# Patient Record
Sex: MALE | Race: BLACK OR AFRICAN AMERICAN | NOT HISPANIC OR LATINO | URBAN - METROPOLITAN AREA
[De-identification: names, ages, dates, MRNs, and addresses within clinical notes are randomized per-mention and may not be internally consistent; named-entity substitution may affect disease eponyms.]

---

## 2021-07-14 ENCOUNTER — OFFICE VISIT (OUTPATIENT)
Dept: URBAN - METROPOLITAN AREA CLINIC 92 | Facility: CLINIC | Age: 61
End: 2021-07-14
Payer: MEDICARE

## 2021-07-14 DIAGNOSIS — R10.33 PERIUMBILICAL ABDOMINAL PAIN: ICD-10-CM

## 2021-07-14 DIAGNOSIS — Z86.010 PERSONAL HISTORY OF COLONIC POLYPS: ICD-10-CM

## 2021-07-14 PROBLEM — 428283002: Status: ACTIVE | Noted: 2021-07-14

## 2021-07-14 PROCEDURE — 99204 OFFICE O/P NEW MOD 45 MIN: CPT | Performed by: INTERNAL MEDICINE

## 2021-07-14 RX ORDER — PANTOPRAZOLE SODIUM 40 MG/1
1 TABLET TABLET, DELAYED RELEASE ORAL ONCE A DAY
Qty: 90 TABLET | Refills: 3 | OUTPATIENT
Start: 2021-07-14

## 2021-07-14 NOTE — HPI-TODAY'S VISIT:
This is a 60-year-old male who now presents for evaluation of abdominal pain.    Patient developed acute periumbilical abdominal pain requiring evaluation at [facility] ED on 07/01/2021.  He had labs that showed no significant abnormality.  He was prescribed Nexium 40 mg daily along with Carafate.  He reports having abdominal pain which can be postprandial in nature and is partially improved with Mylanta.  He has regular bowel movement and denies any hematemesis, melena, or hematochezia.  There is no fever, chills, or constitutional symptoms including unintentional weight loss. he denies any NSAID use but admits to 81 mg aspirin daily  He has history of colon polyps and previously underwent colonoscopy on 03/09/2020 that showed 3 tubular adenomas internal hemorrhoids.

## 2021-08-24 ENCOUNTER — OFFICE VISIT (OUTPATIENT)
Dept: URBAN - METROPOLITAN AREA SURGERY CENTER 16 | Facility: SURGERY CENTER | Age: 61
End: 2021-08-24

## 2021-09-08 ENCOUNTER — OFFICE VISIT (OUTPATIENT)
Dept: URBAN - METROPOLITAN AREA CLINIC 92 | Facility: CLINIC | Age: 61
End: 2021-09-08

## 2021-09-29 ENCOUNTER — OFFICE VISIT (OUTPATIENT)
Dept: URBAN - METROPOLITAN AREA CLINIC 92 | Facility: CLINIC | Age: 61
End: 2021-09-29

## 2021-10-20 ENCOUNTER — OFFICE VISIT (OUTPATIENT)
Dept: URBAN - METROPOLITAN AREA CLINIC 92 | Facility: CLINIC | Age: 61
End: 2021-10-20

## 2021-10-20 RX ORDER — PANTOPRAZOLE SODIUM 40 MG/1
1 TABLET TABLET, DELAYED RELEASE ORAL ONCE A DAY
Qty: 90 TABLET | Refills: 3 | COMMUNITY
Start: 2021-07-14

## 2021-11-18 ENCOUNTER — OFFICE VISIT (OUTPATIENT)
Dept: URBAN - METROPOLITAN AREA SURGERY CENTER 16 | Facility: SURGERY CENTER | Age: 61
End: 2021-11-18

## 2021-12-15 ENCOUNTER — OFFICE VISIT (OUTPATIENT)
Dept: URBAN - METROPOLITAN AREA CLINIC 92 | Facility: CLINIC | Age: 61
End: 2021-12-15

## 2022-01-06 ENCOUNTER — OFFICE VISIT (OUTPATIENT)
Dept: URBAN - METROPOLITAN AREA SURGERY CENTER 16 | Facility: SURGERY CENTER | Age: 62
End: 2022-01-06

## 2023-06-23 ENCOUNTER — OFFICE VISIT (OUTPATIENT)
Dept: URBAN - METROPOLITAN AREA CLINIC 92 | Facility: CLINIC | Age: 63
End: 2023-06-23

## 2023-07-31 ENCOUNTER — OFFICE VISIT (OUTPATIENT)
Dept: URBAN - METROPOLITAN AREA CLINIC 92 | Facility: CLINIC | Age: 63
End: 2023-07-31

## 2023-10-05 ENCOUNTER — OFFICE VISIT (OUTPATIENT)
Dept: URBAN - METROPOLITAN AREA CLINIC 92 | Facility: CLINIC | Age: 63
End: 2023-10-05

## 2023-10-05 RX ORDER — PANTOPRAZOLE SODIUM 40 MG/1
1 TABLET TABLET, DELAYED RELEASE ORAL ONCE A DAY
Qty: 90 TABLET | Refills: 3 | COMMUNITY
Start: 2021-07-14

## 2023-10-05 NOTE — HPI-TODAY'S VISIT:
year-old female/male who presents for a colon cancer screening.   Last colonoscopy:  told to repeat in  No family history of colon polyps or colon cancer.  Patient denies nausea, heartburn, dysphagia, abdominal pain, rectal bleeding, melena, unintentional weight loss, changes in bowel habits and loss of appetite. Patinet denies blood thinner use, pacemaker/defibrillator, diabetes, kidney disease, and home O2.  [facility] 2021 This is a 60-year-old male who now presents for evaluation of abdominal pain.        Patient developed acute periumbilical abdominal pain requiring evaluation at [facility] ED on 07/01/2021. He had labs that showed no significant abnormality. He was prescribed Nexium 40 mg daily along with Carafate. He reports having abdominal pain which can be postprandial in nature and is partially improved with Mylanta. He has regular bowel movement and denies any hematemesis, melena, or hematochezia. There is no fever, chills, or constitutional symptoms including unintentional weight loss. he denies any NSAID use but admits to 81 mg aspirin daily        He has history of colon polyps and previously underwent colonoscopy on 03/09/2020 that showed 3 tubular adenomas internal hemorrhoids.  Was told to do EGD and start protonix egd was not done.

## 2023-10-26 ENCOUNTER — OFFICE VISIT (OUTPATIENT)
Dept: URBAN - METROPOLITAN AREA CLINIC 92 | Facility: CLINIC | Age: 63
End: 2023-10-26

## 2023-10-26 RX ORDER — PANTOPRAZOLE SODIUM 40 MG/1
1 TABLET TABLET, DELAYED RELEASE ORAL ONCE A DAY
Qty: 90 TABLET | Refills: 3 | COMMUNITY
Start: 2021-07-14

## 2023-10-26 NOTE — HPI-TODAY'S VISIT:
year-old female/male who presents for a colon cancer screening.   Last colonoscopy:  told to repeat in  No family history of colon polyps or colon cancer.  Patient denies nausea, heartburn, dysphagia, abdominal pain, rectal bleeding, melena, unintentional weight loss, changes in bowel habits and loss of appetite. Patinet denies blood thinner use, pacemaker/defibrillator, diabetes, kidney disease, and home O2.  [facility] 2021 This is a 60-year-old male who now presents for evaluation of abdominal pain.        Patient developed acute periumbilical abdominal pain requiring evaluation at [facility] ED on 07/01/2021. He had labs that showed no significant abnormality. He was prescribed Nexium 40 mg daily along with Carafate. He reports having abdominal pain which can be postprandial in nature and is partially improved with Mylanta. He has regular bowel movement and denies any hematemesis, melena, or hematochezia. There is no fever, chills, or constitutional symptoms including unintentional weight loss. he denies any NSAID use but admits to 81 mg aspirin daily        He has history of colon polyps and previously underwent colonoscopy on 03/09/2020 that showed 3 tubular adenomas internal hemorrhoids.  Was told to do EGD and start protonix egd was not done.

## 2023-12-01 ENCOUNTER — OFFICE VISIT (OUTPATIENT)
Dept: URBAN - METROPOLITAN AREA CLINIC 86 | Facility: CLINIC | Age: 63
End: 2023-12-01

## 2023-12-01 RX ORDER — PANTOPRAZOLE SODIUM 40 MG/1
1 TABLET TABLET, DELAYED RELEASE ORAL ONCE A DAY
Qty: 90 TABLET | Refills: 3 | COMMUNITY
Start: 2021-07-14

## 2023-12-01 NOTE — HPI-TODAY'S VISIT:
63 year-old male who presents for a colon cancer screening.   Last colonoscopy:  told to repeat in  No family history of colon polyps or colon cancer.  Patient denies nausea, heartburn, dysphagia, abdominal pain, rectal bleeding, melena, unintentional weight loss, changes in bowel habits and loss of appetite. Patinet denies blood thinner use, pacemaker/defibrillator, diabetes, kidney disease, and home O2.

## 2024-05-15 ENCOUNTER — DASHBOARD ENCOUNTERS (OUTPATIENT)
Age: 64
End: 2024-05-15

## 2024-05-20 ENCOUNTER — OFFICE VISIT (OUTPATIENT)
Dept: URBAN - METROPOLITAN AREA CLINIC 92 | Facility: CLINIC | Age: 64
End: 2024-05-20

## 2024-05-20 RX ORDER — PANTOPRAZOLE SODIUM 40 MG/1
1 TABLET TABLET, DELAYED RELEASE ORAL ONCE A DAY
Qty: 90 TABLET | Refills: 3 | COMMUNITY
Start: 2021-07-14

## 2024-06-17 ENCOUNTER — OFFICE VISIT (OUTPATIENT)
Dept: URBAN - METROPOLITAN AREA CLINIC 92 | Facility: CLINIC | Age: 64
End: 2024-06-17

## 2024-06-17 RX ORDER — PANTOPRAZOLE SODIUM 40 MG/1
1 TABLET TABLET, DELAYED RELEASE ORAL ONCE A DAY
Qty: 90 TABLET | Refills: 3 | Status: ACTIVE | COMMUNITY
Start: 2021-07-14

## 2024-06-17 NOTE — HPI-TODAY'S VISIT:
year-old female/male who presents for a colon cancer screening.   Last colonoscopy:  told to repeat in  No family history of colon polyps or colon cancer.  Patient denies nausea, heartburn, dysphagia, abdominal pain, rectal bleeding, melena, unintentional weight loss, changes in bowel habits and loss of appetite. Patinet denies blood thinner use, pacemaker/defibrillator, diabetes, kidney disease, and home O2.  [facility] 2021 This is a 60-year-old male who now presents for evaluation of abdominal pain.        Patient developed acute periumbilical abdominal pain requiring evaluation at [facility] ED on 07/01/2021. He had labs that showed no significant abnormality. He was prescribed Nexium 40 mg daily along with Carafate. He reports having abdominal pain which can be postprandial in nature and is partially improved with Mylanta. He has regular bowel movement and denies any hematemesis, melena, or hematochezia. There is no fever, chills, or constitutional symptoms including unintentional weight loss. he denies any NSAID use but admits to 81 mg aspirin daily        He has history of colon polyps and previously underwent colonoscopy on 03/09/2020 that showed 3 tubular adenomas internal hemorrhoids.  Was told to do EGD and start protonix egd was not done.

## 2024-07-23 ENCOUNTER — OFFICE VISIT (OUTPATIENT)
Dept: URBAN - METROPOLITAN AREA CLINIC 92 | Facility: CLINIC | Age: 64
End: 2024-07-23

## 2024-07-23 RX ORDER — PANTOPRAZOLE SODIUM 40 MG/1
1 TABLET TABLET, DELAYED RELEASE ORAL ONCE A DAY
Qty: 90 TABLET | Refills: 3 | Status: ACTIVE | COMMUNITY
Start: 2021-07-14

## 2024-07-23 NOTE — HPI-TODAY'S VISIT:
year-old female/male who presents for a colon cancer screening.   Last colonoscopy:  told to repeat in  No family history of colon polyps or colon cancer.  Patient denies nausea, heartburn, dysphagia, abdominal pain, rectal bleeding, melena, unintentional weight loss, changes in bowel habits and loss of appetite. Patinet denies blood thinner use, pacemaker/defibrillator, diabetes, kidney disease, and home O2.  [facility] 2021 This is a 60-year-old male who now presents for evaluation of abdominal pain.        Patient developed acute periumbilical abdominal pain requiring evaluation at [facility] ED on 07/01/2021. He had labs that showed no significant abnormality. He was prescribed Nexium 40 mg daily along with Carafate. He reports having abdominal pain which can be postprandial in nature and is partially improved with Mylanta. He has regular bowel movement and denies any hematemesis, melena, or hematochezia. There is no fever, chills, or constitutional symptoms including unintentional weight loss. he denies any NSAID use but admits to 81 mg aspirin daily        He has history of colon polyps and previously underwent colonoscopy on 03/09/2020 that showed 3 tubular adenomas internal hemorrhoids.  Was told to do EGD and start protonix egd was not done.

## 2024-09-17 ENCOUNTER — OFFICE VISIT (OUTPATIENT)
Dept: URBAN - METROPOLITAN AREA CLINIC 92 | Facility: CLINIC | Age: 64
End: 2024-09-17
Payer: MEDICARE

## 2024-09-17 VITALS
HEIGHT: 77 IN | BODY MASS INDEX: 30.46 KG/M2 | TEMPERATURE: 97 F | SYSTOLIC BLOOD PRESSURE: 128 MMHG | WEIGHT: 258 LBS | HEART RATE: 64 BPM | DIASTOLIC BLOOD PRESSURE: 80 MMHG

## 2024-09-17 DIAGNOSIS — Z86.010 PERSONAL HISTORY OF COLONIC POLYPS: ICD-10-CM

## 2024-09-17 PROCEDURE — 99213 OFFICE O/P EST LOW 20 MIN: CPT

## 2024-09-17 RX ORDER — APIXABAN 2.5 MG/1
AS DIRECTED TABLET, FILM COATED ORAL
Status: ACTIVE | COMMUNITY

## 2024-11-14 ENCOUNTER — OFFICE VISIT (OUTPATIENT)
Dept: URBAN - METROPOLITAN AREA SURGERY CENTER 16 | Facility: SURGERY CENTER | Age: 64
End: 2024-11-14

## 2024-12-04 ENCOUNTER — TELEPHONE ENCOUNTER (OUTPATIENT)
Dept: URBAN - METROPOLITAN AREA CLINIC 92 | Facility: CLINIC | Age: 64
End: 2024-12-04

## 2024-12-05 ENCOUNTER — OFFICE VISIT (OUTPATIENT)
Dept: URBAN - METROPOLITAN AREA CLINIC 92 | Facility: CLINIC | Age: 64
End: 2024-12-05

## 2024-12-05 RX ORDER — APIXABAN 2.5 MG/1
AS DIRECTED TABLET, FILM COATED ORAL
Status: ACTIVE | COMMUNITY

## 2024-12-05 NOTE — HPI-TODAY'S VISIT:
63 year-old male who presents for a colon cancer screening.   He has history of colon polyps and previously underwent colonoscopy on 03/09/2020 that showed 3 tubular adenomas internal hemorrhoids. No family history of colon polyps or colon cancer.  Has normal BM daily. Patient denies nausea, heartburn, dysphagia, abdominal pain, rectal bleeding, melena, unintentional weight loss, changes in bowel habits and loss of appetite.  He is on eliquis which he has been on for 1 month. He is on this due to a fib. Sees Dr. Tilley.  Patinet denies pacemaker/defibrillator, diabetes, kidney disease, and home O2.

## 2025-02-11 NOTE — HPI-TODAY'S VISIT:
Angelina Clark (: 1949) is a 76 y.o. male, Established patient, who presents today for:    Chief Complaint   Patient presents with    Medicare AWV    Diabetes     25 ER visit - Blood sugar was high 365 patient did not have any symptoms.  Patient was given a liter of saline and 5 units of insulin blood sugar came down to 169 Metformin xr 500 mg increased to BID.   A1C 7.6 in February  Diabetic foot and eye exam - up to date            Subjective     History of Present Illness  The patient is a 76-year-old male, an established patient, presenting today with concerns of elevated blood sugars and is also due for a Medicare wellness visit. He was last seen in 2023 after establishing care with Dr. Chavez, who was no longer at this office.    His type 2 diabetes was considered controlled at that time, with an A1c of 7 in 10/2023. He was advised to continue his current medications. He had an ER visit on 2024 with concerns about elevated blood sugar. He checked his sugar after eating breakfast that day, and it was 400. Usually, his fasting blood sugar is under 200. He was compliant with his metformin. According to the ER charting, his exam was benign, with a blood sugar level of 365. He was given a liter of saline and 5 units of insulin, which brought his blood sugar down to 169. He was advised to increase his metformin extended release 500 mg to twice a day instead of once a day and follow up with primary care. His A1c checked while in the ER was 7.6. The ER also conducted a CBC, which was essentially normal, with no leukocytosis or anemia. His CMP showed sodium slightly low at 133, as low as 132 in the past in , but typically running low normal at 136 or 135. Electrolytes were normal, creatinine was 0.68, and GFR was greater than 90. Liver enzymes were normal. He is very anxious today about his blood sugars. He has been monitoring his blood glucose levels intermittently at home, which  63 year-old male who presents for a colon cancer screening.   He has history of colon polyps and previously underwent colonoscopy on 03/09/2020 that showed 3 tubular adenomas internal hemorrhoids. No family history of colon polyps or colon cancer.  Has normal BM daily. Patient denies nausea, heartburn, dysphagia, abdominal pain, rectal bleeding, melena, unintentional weight loss, changes in bowel habits and loss of appetite.  He is on eliquis which he has been on for 1 month. He is on this due to a fib. Sees Dr. Tilley.  Patinet denies pacemaker/defibrillator, diabetes, kidney disease, and home O2.